# Patient Record
Sex: MALE | Race: WHITE | NOT HISPANIC OR LATINO | Employment: FULL TIME | URBAN - METROPOLITAN AREA
[De-identification: names, ages, dates, MRNs, and addresses within clinical notes are randomized per-mention and may not be internally consistent; named-entity substitution may affect disease eponyms.]

---

## 2023-02-27 PROBLEM — F41.9 ANXIETY: Status: ACTIVE | Noted: 2023-02-27

## 2023-02-27 PROBLEM — F10.10 ALCOHOL ABUSE: Status: ACTIVE | Noted: 2023-02-27

## 2023-02-27 PROBLEM — M19.90 ARTHRITIS: Status: ACTIVE | Noted: 2023-02-27

## 2023-02-27 RX ORDER — CELECOXIB 100 MG/1
1 CAPSULE ORAL 2 TIMES DAILY
COMMUNITY
Start: 2022-02-14 | End: 2023-04-27 | Stop reason: SDUPTHER

## 2023-02-27 RX ORDER — TRAZODONE HYDROCHLORIDE 50 MG/1
1 TABLET ORAL NIGHTLY PRN
COMMUNITY
Start: 2022-02-14 | End: 2023-04-27 | Stop reason: SDUPTHER

## 2023-02-27 RX ORDER — NALTREXONE 380 MG
KIT INTRAMUSCULAR
COMMUNITY
Start: 2022-04-18 | End: 2023-04-27 | Stop reason: SDUPTHER

## 2023-02-27 RX ORDER — METHOCARBAMOL 750 MG/1
1 TABLET, FILM COATED ORAL DAILY
COMMUNITY
Start: 2021-06-04 | End: 2023-04-27 | Stop reason: SDUPTHER

## 2023-04-18 ENCOUNTER — APPOINTMENT (OUTPATIENT)
Dept: PRIMARY CARE | Facility: CLINIC | Age: 33
End: 2023-04-18
Payer: COMMERCIAL

## 2023-04-27 ENCOUNTER — LAB (OUTPATIENT)
Dept: LAB | Facility: LAB | Age: 33
End: 2023-04-27
Payer: COMMERCIAL

## 2023-04-27 ENCOUNTER — OFFICE VISIT (OUTPATIENT)
Dept: PRIMARY CARE | Facility: CLINIC | Age: 33
End: 2023-04-27
Payer: COMMERCIAL

## 2023-04-27 VITALS
HEIGHT: 74 IN | TEMPERATURE: 98.1 F | OXYGEN SATURATION: 98 % | BODY MASS INDEX: 26.59 KG/M2 | DIASTOLIC BLOOD PRESSURE: 54 MMHG | WEIGHT: 207.2 LBS | SYSTOLIC BLOOD PRESSURE: 110 MMHG | HEART RATE: 72 BPM

## 2023-04-27 DIAGNOSIS — F41.9 ANXIETY: ICD-10-CM

## 2023-04-27 DIAGNOSIS — M19.90 ARTHRITIS: Primary | ICD-10-CM

## 2023-04-27 DIAGNOSIS — F10.10 ALCOHOL ABUSE: ICD-10-CM

## 2023-04-27 LAB
ALANINE AMINOTRANSFERASE (SGPT) (U/L) IN SER/PLAS: 19 U/L (ref 10–52)
ALBUMIN (G/DL) IN SER/PLAS: 4.8 G/DL (ref 3.4–5)
ALKALINE PHOSPHATASE (U/L) IN SER/PLAS: 62 U/L (ref 33–120)
ANION GAP IN SER/PLAS: 13 MMOL/L (ref 10–20)
ASPARTATE AMINOTRANSFERASE (SGOT) (U/L) IN SER/PLAS: 24 U/L (ref 9–39)
BASOPHILS (10*3/UL) IN BLOOD BY AUTOMATED COUNT: 0.05 X10E9/L (ref 0–0.1)
BASOPHILS/100 LEUKOCYTES IN BLOOD BY AUTOMATED COUNT: 0.9 % (ref 0–2)
BILIRUBIN TOTAL (MG/DL) IN SER/PLAS: 0.8 MG/DL (ref 0–1.2)
CALCIUM (MG/DL) IN SER/PLAS: 10.2 MG/DL (ref 8.6–10.3)
CARBON DIOXIDE, TOTAL (MMOL/L) IN SER/PLAS: 28 MMOL/L (ref 21–32)
CHLORIDE (MMOL/L) IN SER/PLAS: 102 MMOL/L (ref 98–107)
CHOLESTEROL (MG/DL) IN SER/PLAS: 204 MG/DL (ref 0–199)
CHOLESTEROL IN HDL (MG/DL) IN SER/PLAS: 59.4 MG/DL
CHOLESTEROL/HDL RATIO: 3.4
CREATININE (MG/DL) IN SER/PLAS: 0.91 MG/DL (ref 0.5–1.3)
EOSINOPHILS (10*3/UL) IN BLOOD BY AUTOMATED COUNT: 0.2 X10E9/L (ref 0–0.7)
EOSINOPHILS/100 LEUKOCYTES IN BLOOD BY AUTOMATED COUNT: 3.7 % (ref 0–6)
ERYTHROCYTE DISTRIBUTION WIDTH (RATIO) BY AUTOMATED COUNT: 12.1 % (ref 11.5–14.5)
ERYTHROCYTE MEAN CORPUSCULAR HEMOGLOBIN CONCENTRATION (G/DL) BY AUTOMATED: 34.1 G/DL (ref 32–36)
ERYTHROCYTE MEAN CORPUSCULAR VOLUME (FL) BY AUTOMATED COUNT: 89 FL (ref 80–100)
ERYTHROCYTES (10*6/UL) IN BLOOD BY AUTOMATED COUNT: 5.76 X10E12/L (ref 4.5–5.9)
GFR MALE: >90 ML/MIN/1.73M2
GLUCOSE (MG/DL) IN SER/PLAS: 83 MG/DL (ref 74–99)
HEMATOCRIT (%) IN BLOOD BY AUTOMATED COUNT: 51.1 % (ref 41–52)
HEMOGLOBIN (G/DL) IN BLOOD: 17.4 G/DL (ref 13.5–17.5)
IMMATURE GRANULOCYTES/100 LEUKOCYTES IN BLOOD BY AUTOMATED COUNT: 0.2 % (ref 0–0.9)
LDL: 122 MG/DL (ref 0–99)
LEUKOCYTES (10*3/UL) IN BLOOD BY AUTOMATED COUNT: 5.3 X10E9/L (ref 4.4–11.3)
LYMPHOCYTES (10*3/UL) IN BLOOD BY AUTOMATED COUNT: 1.68 X10E9/L (ref 1.2–4.8)
LYMPHOCYTES/100 LEUKOCYTES IN BLOOD BY AUTOMATED COUNT: 31.5 % (ref 13–44)
MAGNESIUM (MG/DL) IN SER/PLAS: 2.08 MG/DL (ref 1.6–2.4)
MONOCYTES (10*3/UL) IN BLOOD BY AUTOMATED COUNT: 0.44 X10E9/L (ref 0.1–1)
MONOCYTES/100 LEUKOCYTES IN BLOOD BY AUTOMATED COUNT: 8.2 % (ref 2–10)
NEUTROPHILS (10*3/UL) IN BLOOD BY AUTOMATED COUNT: 2.96 X10E9/L (ref 1.2–7.7)
NEUTROPHILS/100 LEUKOCYTES IN BLOOD BY AUTOMATED COUNT: 55.5 % (ref 40–80)
PLATELETS (10*3/UL) IN BLOOD AUTOMATED COUNT: 180 X10E9/L (ref 150–450)
POTASSIUM (MMOL/L) IN SER/PLAS: 4.1 MMOL/L (ref 3.5–5.3)
PROTEIN TOTAL: 7.5 G/DL (ref 6.4–8.2)
SODIUM (MMOL/L) IN SER/PLAS: 139 MMOL/L (ref 136–145)
TRIGLYCERIDE (MG/DL) IN SER/PLAS: 114 MG/DL (ref 0–149)
UREA NITROGEN (MG/DL) IN SER/PLAS: 22 MG/DL (ref 6–23)
VLDL: 23 MG/DL (ref 0–40)

## 2023-04-27 PROCEDURE — 36415 COLL VENOUS BLD VENIPUNCTURE: CPT

## 2023-04-27 PROCEDURE — 83735 ASSAY OF MAGNESIUM: CPT

## 2023-04-27 PROCEDURE — 85025 COMPLETE CBC W/AUTO DIFF WBC: CPT

## 2023-04-27 PROCEDURE — 80053 COMPREHEN METABOLIC PANEL: CPT

## 2023-04-27 PROCEDURE — 80061 LIPID PANEL: CPT

## 2023-04-27 PROCEDURE — 1036F TOBACCO NON-USER: CPT | Performed by: FAMILY MEDICINE

## 2023-04-27 PROCEDURE — 99214 OFFICE O/P EST MOD 30 MIN: CPT | Performed by: FAMILY MEDICINE

## 2023-04-27 RX ORDER — METHOCARBAMOL 750 MG/1
750 TABLET, FILM COATED ORAL 2 TIMES DAILY PRN
Qty: 60 TABLET | Refills: 5 | Status: SHIPPED | OUTPATIENT
Start: 2023-04-27 | End: 2024-04-30 | Stop reason: SDUPTHER

## 2023-04-27 RX ORDER — NALTREXONE 380 MG
380 KIT INTRAMUSCULAR
Qty: 1 EACH | Refills: 5 | Status: SHIPPED | OUTPATIENT
Start: 2023-04-27 | End: 2023-10-05 | Stop reason: WASHOUT

## 2023-04-27 RX ORDER — NICOTINE 21-14-7MG
KIT TRANSDERMAL
COMMUNITY
Start: 2023-03-03 | End: 2023-10-05 | Stop reason: WASHOUT

## 2023-04-27 RX ORDER — CELECOXIB 100 MG/1
100 CAPSULE ORAL 2 TIMES DAILY
Qty: 60 CAPSULE | Refills: 5 | Status: SHIPPED | OUTPATIENT
Start: 2023-04-27 | End: 2023-09-08 | Stop reason: SDUPTHER

## 2023-04-27 RX ORDER — SYRINGE WITH NEEDLE, 1 ML 25GX5/8"
SYRINGE, EMPTY DISPOSABLE MISCELLANEOUS
COMMUNITY
Start: 2022-10-11 | End: 2023-10-05 | Stop reason: WASHOUT

## 2023-04-27 RX ORDER — TRAZODONE HYDROCHLORIDE 50 MG/1
50 TABLET ORAL NIGHTLY
Qty: 30 TABLET | Refills: 5 | Status: SHIPPED | OUTPATIENT
Start: 2023-04-27 | End: 2023-10-24 | Stop reason: SDUPTHER

## 2023-04-27 NOTE — PROGRESS NOTES
"Subjective   Patient ID:  Martin Brasher is a 33 y.o. male patient who presents today for Anxiety and Alcoholism.       Past Medical, Surgical, and Family History reviewed and updated in chart.     Reviewed all medications by prescribing practitioner or clinical pharmacist (such as prescriptions, OTCs, herbal therapies and supplements) and documented in the medical record.     Patient is having shoulder pain for the last couple weeks, no injuries  that he knows of     Anxiety:  The patient is presenting today for a follow up of anxiety disorder.  He  has the following anxiety symptoms: difficulty concentrating and panic attacks. Symptoms have been gradually worsening since that time.  He  denies any current suicidal and homicidal ideation.  The patient has not been hospitalized for this in the last 6 months. Symptoms are Patient denies any side effects to the medications.     The patient denies none.    Alcoholism: The patient is here for an evaluation of his alcoholism. The patient has not been hospitalized for this in the last 6 months. Patient states that they Alcohol Use: Yes, patient drinks alcohol. Patient denies any side effects to the medications. The patient states that he is currently in stable condition, he is compliant with medications.       Arthritis: Martin Brasher is an 33 y.o. male who presents with arthralgias that began several months ago. Pain is located in the right shoulder(s). The patient has been hospitalized for this in the last 6 months.  Patient denies any side effects to the medications.       The patient is compliant with medications.      The patient denies SOB, dizziness, headaches, nausea, vomiting, constipation, chest pain, chest pressure.      Patient Care Team:  Emmett Solis MD as PCP - General  Emmett Solis MD as PCP - [facility] PCP        Objective   Vitals:  /54   Pulse 72   Temp 36.7 °C (98.1 °F)   Ht 1.88 m (6' 2\")   Wt 94 kg (207 lb 3.2 oz)   SpO2 " 98%   BMI 26.60 kg/m²     Physical Exam  Vitals reviewed.   Constitutional:       Appearance: Normal appearance.   Neck:      Vascular: No carotid bruit.   Cardiovascular:      Rate and Rhythm: Normal rate and regular rhythm.      Pulses: Normal pulses.      Heart sounds: Normal heart sounds.   Pulmonary:      Effort: Pulmonary effort is normal. No respiratory distress.      Breath sounds: Normal breath sounds. No wheezing.   Abdominal:      General: There is no distension.      Palpations: Abdomen is soft. There is no mass.      Tenderness: There is no abdominal tenderness. There is no right CVA tenderness, left CVA tenderness, guarding or rebound.   Musculoskeletal:      Cervical back: Normal range of motion and neck supple. No rigidity.      Right lower leg: No edema.      Left lower leg: No edema.   Lymphadenopathy:      Cervical: No cervical adenopathy.   Neurological:      Mental Status: He is alert.         Below is the patient's most recent value for Albumin, ALT, AST, BUN, Calcium, Chloride, Cholesterol, CO2, Creatinine, GFR, Glucose, HDL, Hematocrit, Hemoglobin, Hemoglobin A1C, LDL, Magnesium, Phosphorus, Platelets, Potassium, PSA, Sodium, Triglycerides, and WBC.   Lab Results   Component Value Date    ALBUMIN 5.2 (H) 10/18/2022    ALT 20 10/18/2022    AST 23 10/18/2022    BUN 22 10/18/2022    CALCIUM 10.4 (H) 10/18/2022     10/18/2022    CO2 28 10/18/2022    CREATININE 0.94 10/18/2022    HCT 51.1 10/18/2022    HGB 17.2 10/18/2022     10/18/2022    K 3.7 10/18/2022     10/18/2022    WBC 8.9 10/18/2022           Assessment/Plan   Problem List Items Addressed This Visit          Musculoskeletal    Arthritis - Primary    Current Assessment & Plan      Is stable, continue with current treatment.             Other    Alcohol abuse    Current Assessment & Plan      Is well controlled, continue with current medications.          Anxiety    Current Assessment & Plan      Is well controlled,  continue with current medications.             Follow up in: 6 months with labs today.     Scribe Attestation  By signing my name below, I, Lefty Davis   attest that this documentation has been prepared under the direction and in the presence of Emmett Solis MD.

## 2023-09-08 DIAGNOSIS — F41.9 ANXIETY: ICD-10-CM

## 2023-09-08 RX ORDER — CELECOXIB 100 MG/1
100 CAPSULE ORAL 2 TIMES DAILY
Qty: 60 CAPSULE | Refills: 5 | Status: SHIPPED | OUTPATIENT
Start: 2023-09-08 | End: 2023-10-30 | Stop reason: SDUPTHER

## 2023-09-08 NOTE — TELEPHONE ENCOUNTER
Rx Refill Request Telephone Encounter    Name:  Martin Brasher  :  [id number]  Medication Name:  celebrex 100mg  Specific Pharmacy location:  [facility]   Date of last appointment: 23   Date of next appointment:  10/30/23   Best number to reach patient:  [phone]

## 2023-10-03 NOTE — PROGRESS NOTES
Subjective    Patient ID: Martin Brasher is a 33 y.o. male who presents for Anxiety and Arthritis.

## 2023-10-04 ENCOUNTER — HOSPITAL ENCOUNTER (OUTPATIENT)
Dept: RADIOLOGY | Facility: HOSPITAL | Age: 33
Discharge: HOME | End: 2023-10-04
Payer: COMMERCIAL

## 2023-10-04 ENCOUNTER — OFFICE VISIT (OUTPATIENT)
Dept: PRIMARY CARE | Facility: CLINIC | Age: 33
End: 2023-10-04
Payer: COMMERCIAL

## 2023-10-04 VITALS
TEMPERATURE: 97.7 F | DIASTOLIC BLOOD PRESSURE: 84 MMHG | BODY MASS INDEX: 26.62 KG/M2 | HEIGHT: 74 IN | OXYGEN SATURATION: 96 % | RESPIRATION RATE: 16 BRPM | WEIGHT: 207.4 LBS | SYSTOLIC BLOOD PRESSURE: 110 MMHG | HEART RATE: 88 BPM

## 2023-10-04 DIAGNOSIS — I82.B12: ICD-10-CM

## 2023-10-04 DIAGNOSIS — M79.89 LEFT ARM SWELLING: ICD-10-CM

## 2023-10-04 DIAGNOSIS — F41.9 ANXIETY: ICD-10-CM

## 2023-10-04 DIAGNOSIS — M79.89 LEFT ARM SWELLING: Primary | ICD-10-CM

## 2023-10-04 PROCEDURE — 1036F TOBACCO NON-USER: CPT | Performed by: FAMILY MEDICINE

## 2023-10-04 PROCEDURE — 99213 OFFICE O/P EST LOW 20 MIN: CPT | Performed by: FAMILY MEDICINE

## 2023-10-04 PROCEDURE — 93971 EXTREMITY STUDY: CPT

## 2023-10-04 PROCEDURE — 93971 EXTREMITY STUDY: CPT | Performed by: RADIOLOGY

## 2023-10-04 NOTE — TELEPHONE ENCOUNTER
The Ultrasound Department from [facility] called in regarding the patient. They stated the patient is positive for DVT and could not allow the patient to leaver without instructions from Dr. Solis.  Shadia called Dr. Solis regarding this as he had already left at the time Ultrasound contacted us. Per Dr. Solis, 4 boxes of Eliquis 5 mg were supplied to the patient. Patient is to take 1 tablet twice daily and is scheduled to follow up with Dr. Solis on 10/30

## 2023-10-04 NOTE — PROGRESS NOTES
"Subjective    Patient ID: Martin Brasher is a 33 y.o. male who presents for Anxiety, Arthritis, Arm Pain, and Wrist Pain.   A week and half ago he twisted his left wrist and had some swelling in his wrist. Over the past few days he noticed the swelling radiating up his arm. He has also noticed some prominence of blood vessels on the anterior chest.  He reports no injury to the arm or shoulder.    The patient denies having the following symptoms: chest pain, chest pressure, fever, chills, N/V/D, constipation, dizziness, headaches, SOB.    Objective   Vitals:  /84   Pulse 88   Temp 36.5 °C (97.7 °F)   Resp 16   Ht 1.88 m (6' 2\")   Wt 94.1 kg (207 lb 6.4 oz)   SpO2 96%   BMI 26.63 kg/m²     Physical Exam  Vitals reviewed.   Cardiovascular:      Rate and Rhythm: Normal rate and regular rhythm.      Pulses: Normal pulses.      Heart sounds: Normal heart sounds.   Pulmonary:      Effort: Pulmonary effort is normal.      Breath sounds: Normal breath sounds.   Musculoskeletal:      Right lower leg: No edema.      Left lower leg: No edema.      Comments: Left arm swollen up to shoulder, no tenderness.    Skin:     Comments: Prominence of several veins on the anterior left chest wall and superior aspect of shoulder.    Neurological:      Mental Status: He is alert and oriented to person, place, and time.      Assessment/Plan   Problem List Items Addressed This Visit       Anxiety    Left arm swelling - Primary    Relevant Orders    Vascular US upper extremity venous duplex left (Completed)     Follow up as scheduled.    Scribe Attestation  By signing my name below, IIsatu Scribe   attest that this documentation has been prepared under the direction and in the presence of Emmett Solis MD.   "

## 2023-10-05 PROBLEM — R60.0 EDEMA OF LEFT UPPER EXTREMITY: Status: ACTIVE | Noted: 2023-10-05

## 2023-10-05 PROBLEM — M79.89 LEFT ARM SWELLING: Status: ACTIVE | Noted: 2023-10-05

## 2023-10-05 NOTE — PROGRESS NOTES
"Subjective    Patient ID: Martin Brasher is a 33 y.o. male who presents for Anxiety, Arthritis, Arm Pain, and Wrist Pain.                 The patient denies having the following symptoms: chest pain, chest pressure, fever, chills, N/V/D, constipation, dizziness, headaches, SOB.    Objective   Vitals:  /84   Pulse 88   Temp 36.5 °C (97.7 °F)   Resp 16   Ht 1.88 m (6' 2\")   Wt 94.1 kg (207 lb 6.4 oz)   SpO2 96%   BMI 26.63 kg/m²     Physical Exam     .   "

## 2023-10-10 NOTE — TELEPHONE ENCOUNTER
More samples ready for patient. I have mailed the patient an applications for patient assistance. According to the chart he does have [facility].      Please sign off on rx pending for samples given.

## 2023-10-19 ENCOUNTER — TELEPHONE (OUTPATIENT)
Dept: PRIMARY CARE | Facility: CLINIC | Age: 33
End: 2023-10-19
Payer: COMMERCIAL

## 2023-10-19 NOTE — TELEPHONE ENCOUNTER
PATIENT STATES HE NOW HAS INSURANCE, [employer] WITH EFFECTIVE DATE OF 9-29-23.   HE WANTED TO MAKE SURE HIS 10-4-23 VISIT WITH DR. SIMENTAL AND HIS DVT TESTING HE HAD DONE AT THE HOSPITAL ALL ON 10-4-23 WAS BILLED TO HIS INSURANCE SINCE BEFORE HE WAS SELF PAY IN THE PAST.      I DID SPEAK WITH BILLING AND THEY FIXED THEIR RECORDS TO SHOW HE HAS THE [employer] INSURANCE NOW AND SUBMITTED HIS 10-4-23 VISIT WITH [facility] AND HIS DVT [facility] TESTING ON 10-4-23 TO HIS [employer] INSURANCE.      SIDE NOTE:  PATIENT HAS [facility] ALSO LISTED IN HIS CHART BUT THIS IS DENTAL ONLY.  BILLING IS AWARE NOT TO BILL THIS ONE.

## 2023-10-24 DIAGNOSIS — F41.9 ANXIETY: Primary | ICD-10-CM

## 2023-10-24 RX ORDER — TRAZODONE HYDROCHLORIDE 50 MG/1
50 TABLET ORAL NIGHTLY
Qty: 30 TABLET | Refills: 5 | Status: SHIPPED | OUTPATIENT
Start: 2023-10-24 | End: 2024-04-16 | Stop reason: SDUPTHER

## 2023-10-24 NOTE — TELEPHONE ENCOUNTER
Rx Refill Request Telephone Encounter    Name:  Martin Brasher  :  [id number]  Medication Name:  trazodone (Desyrel) 50 mg   Specific Pharmacy location:  [facility]   Date of last appointment:    Date of next appointment:  10/30  Best number to reach patient:  [phone]

## 2023-10-28 NOTE — PROGRESS NOTES
"Subjective    Patient ID: Martin Brasher is a 33 y.o. male who presents for Follow-up.     Left arm swelling: Patient had testing done which confirmed a blood clot in his left arm. He is an active gym goer. He will have to do light-weight lifting until issue has resolved.     Anxiety:  The patient is presenting today for a follow up of anxiety disorder. He denies having any current suicidal and/or homicidal ideation.         The patient denies having the following symptoms: chest pain, chest pressure, fever, chills, N/V/D, constipation, dizziness, headaches, SOB.    Objective   Vitals:  /80   Pulse 96   Temp 36.7 °C (98.1 °F)   Resp 16   Ht 1.88 m (6' 2\")   Wt 94.3 kg (207 lb 12.8 oz)   SpO2 96%   BMI 26.68 kg/m²     Physical Exam  Vitals reviewed.   Cardiovascular:      Rate and Rhythm: Normal rate and regular rhythm.      Pulses: Normal pulses.      Heart sounds: Normal heart sounds.   Pulmonary:      Effort: Pulmonary effort is normal.      Breath sounds: Normal breath sounds.   Musculoskeletal:      Right lower leg: No edema.      Left lower leg: No edema.   Neurological:      Mental Status: He is alert and oriented to person, place, and time.            Labs reviewed from :   IMPRESSION:  Partially occlusive thrombus in the left subclavian vein. No other  venous thrombus..    Assessment/Plan   Problem List Items Addressed This Visit       Anxiety - Primary      Is well controlled, continue with current medications.           Relevant Medications    celecoxib (CeleBREX) 100 mg capsule    Left subclavian vein thrombosis (CMS/HCC)      This condition is poorly controlled, therapeutic changes necessary.          Relevant Medications    apixaban (Eliquis) 5 mg tablet    Other Relevant Orders    Follow Up In [facility] - PCP - Established       Follow up in: 6 month(s) or sooner if needed with labs prior.     Scribe Attestation  By signing my name below, I, Carli Singh , Lefty   attest " that this documentation has been prepared under the direction and in the presence of Emmett Solis MD.

## 2023-10-30 ENCOUNTER — APPOINTMENT (OUTPATIENT)
Dept: PRIMARY CARE | Facility: CLINIC | Age: 33
End: 2023-10-30
Payer: COMMERCIAL

## 2023-10-30 ENCOUNTER — OFFICE VISIT (OUTPATIENT)
Dept: PRIMARY CARE | Facility: CLINIC | Age: 33
End: 2023-10-30
Payer: COMMERCIAL

## 2023-10-30 VITALS
TEMPERATURE: 98.1 F | RESPIRATION RATE: 16 BRPM | WEIGHT: 207.8 LBS | HEIGHT: 74 IN | OXYGEN SATURATION: 96 % | BODY MASS INDEX: 26.67 KG/M2 | DIASTOLIC BLOOD PRESSURE: 80 MMHG | HEART RATE: 96 BPM | SYSTOLIC BLOOD PRESSURE: 124 MMHG

## 2023-10-30 DIAGNOSIS — I82.B12: ICD-10-CM

## 2023-10-30 DIAGNOSIS — F41.9 ANXIETY: Primary | ICD-10-CM

## 2023-10-30 PROCEDURE — 99213 OFFICE O/P EST LOW 20 MIN: CPT | Performed by: FAMILY MEDICINE

## 2023-10-30 PROCEDURE — 1036F TOBACCO NON-USER: CPT | Performed by: FAMILY MEDICINE

## 2023-10-30 RX ORDER — CELECOXIB 100 MG/1
100 CAPSULE ORAL 2 TIMES DAILY
Qty: 60 CAPSULE | Refills: 5 | Status: SHIPPED | OUTPATIENT
Start: 2023-10-30 | End: 2024-04-30 | Stop reason: SDUPTHER

## 2024-02-08 ENCOUNTER — TELEPHONE (OUTPATIENT)
Dept: PRIMARY CARE | Facility: CLINIC | Age: 34
End: 2024-02-08
Payer: COMMERCIAL

## 2024-02-08 NOTE — TELEPHONE ENCOUNTER
Patient called in stating he has had a blood clot in his shoulder for roughly 5-6 months now. Patient states he still has bruising on his chest and shoulder. Patient is wondering since the bruising is still there, does it mean that the blood clot isn't getting any better. Patient states if that is the case, he is wondering if the eliquis can be increased to try to dissolve the clot more? Please advise.

## 2024-04-16 DIAGNOSIS — F41.9 ANXIETY: ICD-10-CM

## 2024-04-16 RX ORDER — TRAZODONE HYDROCHLORIDE 50 MG/1
50 TABLET ORAL NIGHTLY
Qty: 30 TABLET | Refills: 5 | Status: SHIPPED | OUTPATIENT
Start: 2024-04-16

## 2024-04-16 NOTE — TELEPHONE ENCOUNTER
Rx Refill Request Telephone Encounter    Name:  Martin Brasher  :  [id number]  Medication Name:  traZODone (Desyrel) 50 mg tablet     Specific Pharmacy location:    [facility] [address] [place] [id number] [facility]     Date of last appointment:  10/30/23  Date of next appointment:  24  Best number to reach patient:  [phone]

## 2024-04-29 ENCOUNTER — APPOINTMENT (OUTPATIENT)
Dept: PRIMARY CARE | Facility: CLINIC | Age: 34
End: 2024-04-29
Payer: COMMERCIAL

## 2024-04-29 NOTE — PROGRESS NOTES
"Subjective   Patient ID: Martin Brasher is a 34 y.o. male who presents for Anxiety and Arthritis.      Anxiety:  The patient is presenting today for a follow up of anxiety disorder. He denies having any current suicidal and/or homicidal ideation.   He is feeling well and reports no concerning symptoms.    Arthritis:  he hasn't been taking celebrex but he has been stretching. He is still taking the trazodone.     Left Subclavian Vein Thrombosis: he feels fine. The swelling has reduced. He has been going to the gym and the arm isn't getting as swollen as it was before. He wants to know if he can start working out his chest again. He is still taking Eliquis.    He has not been drinking.     Objective   /60   Pulse 83   Temp 36.6 °C (97.9 °F)   Resp 16   Ht 1.88 m (6' 2\")   Wt 92.5 kg (204 lb)   SpO2 93%   BMI 26.19 kg/m²     Physical Exam  Vitals reviewed.   Constitutional:       Appearance: Normal appearance.   Cardiovascular:      Rate and Rhythm: Normal rate and regular rhythm.      Pulses: Normal pulses.      Heart sounds: Normal heart sounds.   Pulmonary:      Effort: Pulmonary effort is normal.      Breath sounds: Normal breath sounds.   Abdominal:      General: Abdomen is flat.      Palpations: Abdomen is soft.   Musculoskeletal:      Cervical back: Normal range of motion and neck supple.      Comments: No swelling in either arm.   Neurological:      Mental Status: He is alert.         Labs reviewed from :       no labs were reviewed today       Assessment/Plan   Problem List Items Addressed This Visit          Coag and Thromboembolic    Left subclavian vein thrombosis (Multi)      Is stable, continue with current treatment.             Mental Health    Anxiety      Is well controlled, continue with current medications.             Musculoskeletal and Injuries    Arthritis      Is well controlled, continue with current medications.                 Follow up in: 6 months or sooner if needed without " labs prior.    Scribe Attestation  By signing my name below, I, Jessica Richardson , Scrmakenna   attest that this documentation has been prepared under the direction and in the presence of Emmett Solis MD.

## 2024-04-30 ENCOUNTER — OFFICE VISIT (OUTPATIENT)
Dept: PRIMARY CARE | Facility: CLINIC | Age: 34
End: 2024-04-30
Payer: COMMERCIAL

## 2024-04-30 VITALS
RESPIRATION RATE: 16 BRPM | HEIGHT: 74 IN | DIASTOLIC BLOOD PRESSURE: 60 MMHG | WEIGHT: 204 LBS | BODY MASS INDEX: 26.18 KG/M2 | OXYGEN SATURATION: 93 % | HEART RATE: 83 BPM | SYSTOLIC BLOOD PRESSURE: 110 MMHG | TEMPERATURE: 97.9 F

## 2024-04-30 DIAGNOSIS — M19.90 ARTHRITIS: ICD-10-CM

## 2024-04-30 DIAGNOSIS — F41.9 ANXIETY: ICD-10-CM

## 2024-04-30 DIAGNOSIS — I82.B12: ICD-10-CM

## 2024-04-30 PROBLEM — F10.10 ALCOHOL ABUSE: Status: RESOLVED | Noted: 2023-02-27 | Resolved: 2024-04-30

## 2024-04-30 PROCEDURE — 99213 OFFICE O/P EST LOW 20 MIN: CPT | Performed by: FAMILY MEDICINE

## 2024-04-30 PROCEDURE — 1036F TOBACCO NON-USER: CPT | Performed by: FAMILY MEDICINE

## 2024-04-30 RX ORDER — METHOCARBAMOL 750 MG/1
750 TABLET, FILM COATED ORAL 2 TIMES DAILY PRN
Qty: 60 TABLET | Refills: 5 | Status: SHIPPED | OUTPATIENT
Start: 2024-04-30

## 2024-04-30 RX ORDER — CELECOXIB 100 MG/1
100 CAPSULE ORAL 2 TIMES DAILY
Qty: 60 CAPSULE | Refills: 5 | Status: SHIPPED | OUTPATIENT
Start: 2024-04-30

## 2024-04-30 ASSESSMENT — PATIENT HEALTH QUESTIONNAIRE - PHQ9: 2. FEELING DOWN, DEPRESSED OR HOPELESS: NOT AT ALL

## 2024-10-14 DIAGNOSIS — F41.9 ANXIETY: Primary | ICD-10-CM

## 2024-10-14 RX ORDER — TRAZODONE HYDROCHLORIDE 50 MG/1
50 TABLET ORAL NIGHTLY
Qty: 30 TABLET | Refills: 5 | Status: SHIPPED | OUTPATIENT
Start: 2024-10-14 | End: 2025-04-12

## 2024-10-14 NOTE — TELEPHONE ENCOUNTER
,Rx Refill Request     Name: Martin SILVA Sameera  :  1990   Medication Name:    traZODone (Desyrel) 50 mg tablet    Last fill: 2024 30 day supply Q 30 R 0  Specific Pharmacy location:  [facility]  Date of last appointment:  2024   Date of next appointment:  10/29/2024   Best number to reach patient:  [phone]       RX PENDED to [facility] as directed by Electronic Correspondence

## 2024-10-29 ENCOUNTER — APPOINTMENT (OUTPATIENT)
Dept: PRIMARY CARE | Facility: CLINIC | Age: 34
End: 2024-10-29
Payer: COMMERCIAL

## 2024-10-31 ENCOUNTER — APPOINTMENT (OUTPATIENT)
Dept: PRIMARY CARE | Facility: CLINIC | Age: 34
End: 2024-10-31
Payer: COMMERCIAL

## 2024-10-31 VITALS
HEIGHT: 74 IN | SYSTOLIC BLOOD PRESSURE: 120 MMHG | OXYGEN SATURATION: 97 % | DIASTOLIC BLOOD PRESSURE: 78 MMHG | WEIGHT: 195.6 LBS | BODY MASS INDEX: 25.1 KG/M2 | HEART RATE: 83 BPM | RESPIRATION RATE: 16 BRPM | TEMPERATURE: 97.3 F

## 2024-10-31 DIAGNOSIS — M19.90 ARTHRITIS: ICD-10-CM

## 2024-10-31 DIAGNOSIS — I82.B12 LEFT SUBCLAVIAN VEIN THROMBOSIS: Primary | ICD-10-CM

## 2024-10-31 DIAGNOSIS — F41.9 ANXIETY: ICD-10-CM

## 2024-10-31 PROCEDURE — 3008F BODY MASS INDEX DOCD: CPT | Performed by: FAMILY MEDICINE

## 2024-10-31 PROCEDURE — 99214 OFFICE O/P EST MOD 30 MIN: CPT | Performed by: FAMILY MEDICINE

## 2024-10-31 PROCEDURE — 1036F TOBACCO NON-USER: CPT | Performed by: FAMILY MEDICINE

## 2024-10-31 RX ORDER — CELECOXIB 100 MG/1
100 CAPSULE ORAL 2 TIMES DAILY
Qty: 60 CAPSULE | Refills: 5 | Status: SHIPPED | OUTPATIENT
Start: 2024-10-31

## 2024-10-31 RX ORDER — METHOCARBAMOL 750 MG/1
750 TABLET, FILM COATED ORAL 2 TIMES DAILY PRN
Qty: 60 TABLET | Refills: 5 | Status: SHIPPED | OUTPATIENT
Start: 2024-10-31

## 2024-10-31 ASSESSMENT — ENCOUNTER SYMPTOMS
OCCASIONAL FEELINGS OF UNSTEADINESS: 0
DEPRESSION: 0
LOSS OF SENSATION IN FEET: 0

## 2024-10-31 ASSESSMENT — PATIENT HEALTH QUESTIONNAIRE - PHQ9
2. FEELING DOWN, DEPRESSED OR HOPELESS: NOT AT ALL
1. LITTLE INTEREST OR PLEASURE IN DOING THINGS: NOT AT ALL
SUM OF ALL RESPONSES TO PHQ9 QUESTIONS 1 AND 2: 0

## 2025-03-06 ENCOUNTER — APPOINTMENT (OUTPATIENT)
Dept: PRIMARY CARE | Facility: CLINIC | Age: 35
End: 2025-03-06
Payer: COMMERCIAL

## 2025-04-02 ENCOUNTER — APPOINTMENT (OUTPATIENT)
Dept: PRIMARY CARE | Facility: CLINIC | Age: 35
End: 2025-04-02
Payer: COMMERCIAL

## 2025-04-02 VITALS
HEART RATE: 82 BPM | HEIGHT: 74 IN | SYSTOLIC BLOOD PRESSURE: 124 MMHG | BODY MASS INDEX: 26.8 KG/M2 | DIASTOLIC BLOOD PRESSURE: 66 MMHG | RESPIRATION RATE: 16 BRPM | WEIGHT: 208.8 LBS | TEMPERATURE: 97 F | OXYGEN SATURATION: 98 %

## 2025-04-02 DIAGNOSIS — M19.90 ARTHRITIS: ICD-10-CM

## 2025-04-02 DIAGNOSIS — I82.B12 LEFT SUBCLAVIAN VEIN THROMBOSIS: ICD-10-CM

## 2025-04-02 DIAGNOSIS — F41.9 ANXIETY: ICD-10-CM

## 2025-04-02 PROCEDURE — 1036F TOBACCO NON-USER: CPT | Performed by: FAMILY MEDICINE

## 2025-04-02 PROCEDURE — 3008F BODY MASS INDEX DOCD: CPT | Performed by: FAMILY MEDICINE

## 2025-04-02 PROCEDURE — 99213 OFFICE O/P EST LOW 20 MIN: CPT | Performed by: FAMILY MEDICINE

## 2025-04-02 RX ORDER — TRAZODONE HYDROCHLORIDE 50 MG/1
50 TABLET ORAL NIGHTLY
Qty: 30 TABLET | Refills: 5 | Status: SHIPPED | OUTPATIENT
Start: 2025-04-02 | End: 2025-09-29

## 2025-04-02 RX ORDER — CELECOXIB 100 MG/1
100 CAPSULE ORAL 2 TIMES DAILY
Qty: 60 CAPSULE | Refills: 5 | Status: CANCELLED | OUTPATIENT
Start: 2025-04-02

## 2025-04-02 RX ORDER — METHOCARBAMOL 750 MG/1
750 TABLET, FILM COATED ORAL 2 TIMES DAILY PRN
Qty: 60 TABLET | Refills: 5 | Status: SHIPPED | OUTPATIENT
Start: 2025-04-02

## 2025-04-02 ASSESSMENT — ENCOUNTER SYMPTOMS
DEPRESSION: 0
LOSS OF SENSATION IN FEET: 0
OCCASIONAL FEELINGS OF UNSTEADINESS: 0

## 2025-04-02 ASSESSMENT — PATIENT HEALTH QUESTIONNAIRE - PHQ9
SUM OF ALL RESPONSES TO PHQ9 QUESTIONS 1 AND 2: 0
2. FEELING DOWN, DEPRESSED OR HOPELESS: NOT AT ALL
1. LITTLE INTEREST OR PLEASURE IN DOING THINGS: NOT AT ALL

## 2025-04-02 NOTE — PROGRESS NOTES
"Subjective   Patient ID:  Martin Brasher is a 35 y.o. male patient who presents today for subclavian vein thrombosis (left), Arthritis, and Anxiety.    Subclavian vein thrombosis, left sided: Patient is doing well. Left arm is no longer swollen. Patient uses Eliquis daily. No issues with the cost; patient has insurance coverage. No new issues or complaints. Patient is easing back into the gym and doing arm workouts.     Arthritis: Doing well with Robaxin. Patient has not been taking Celebrex for the past few months. He sometimes feels like he needs it but has not gotten it.     Anxiety: Patient is doing well and sleeps well.     Objective   Vitals:  /66   Pulse 82   Temp 36.1 °C (97 °F)   Resp 16   Ht 1.88 m (6' 2\")   Wt 94.7 kg (208 lb 12.8 oz)   SpO2 98%   BMI 26.81 kg/m²       Physical Exam  Vitals reviewed.   Constitutional:       Appearance: Normal appearance.   Cardiovascular:      Rate and Rhythm: Normal rate and regular rhythm.      Pulses: Normal pulses.      Heart sounds: Normal heart sounds.   Pulmonary:      Effort: Pulmonary effort is normal.      Breath sounds: Normal breath sounds.   Abdominal:      General: Abdomen is flat.      Palpations: Abdomen is soft.   Musculoskeletal:      Cervical back: Normal range of motion and neck supple.   Neurological:      Mental Status: He is alert.         Assessment/Plan   Problem List Items Addressed This Visit       Anxiety    Current Assessment & Plan     This condition is stable, continue with current treatment.           Relevant Medications    traZODone (Desyrel) 50 mg tablet    Arthritis    Current Assessment & Plan     This condition is stable, continue with current treatment.           Relevant Medications    methocarbamol (Robaxin) 750 mg tablet    Left subclavian vein thrombosis    Current Assessment & Plan     This condition is stable, continue with current treatment.           Relevant Medications    apixaban (Eliquis) 5 mg tablet    " Other Relevant Orders    Follow Up In [facility] - PCP - Established   Patient will need to look for less expensive place to get the ultrasound done.    Follow up in: 4 month(s) for follow-up of the above issues or sooner if needed without labs prior.       Scribe Attestation  By signing my name below, IAnny , Scribstephanie attest that this documentation has been prepared under the direction and in the presence of Emmett Solis MD.  IEmmett MD, personally performed the services described in the documentation as scribed by Anny Gonzalez in my presence and confirm that it is both accurate and complete.

## 2025-07-09 ENCOUNTER — PATIENT MESSAGE (OUTPATIENT)
Dept: PRIMARY CARE | Facility: CLINIC | Age: 35
End: 2025-07-09
Payer: COMMERCIAL

## 2025-07-10 DIAGNOSIS — M19.90 ARTHRITIS: Primary | ICD-10-CM

## 2025-07-10 RX ORDER — CELECOXIB 100 MG/1
100 CAPSULE ORAL 2 TIMES DAILY
Qty: 30 CAPSULE | Refills: 0 | Status: CANCELLED | OUTPATIENT
Start: 2025-07-10

## 2025-07-10 RX ORDER — CELECOXIB 100 MG/1
100 CAPSULE ORAL 2 TIMES DAILY
COMMUNITY

## 2025-07-10 NOTE — TELEPHONE ENCOUNTER
Rx Refill Request Telephone Encounter    Name:  Martin Brasher  :  [id number]  Medication Name:  Celebrex 100mg   Specific Pharmacy location:  [facility]  Last Appointment: 2025  Upcoming Appointment: 2025  Best number to reach patient:  [phone]

## 2025-08-06 ENCOUNTER — APPOINTMENT (OUTPATIENT)
Dept: PRIMARY CARE | Facility: CLINIC | Age: 35
End: 2025-08-06
Payer: COMMERCIAL